# Patient Record
Sex: FEMALE | Race: WHITE | NOT HISPANIC OR LATINO | Employment: STUDENT | URBAN - METROPOLITAN AREA
[De-identification: names, ages, dates, MRNs, and addresses within clinical notes are randomized per-mention and may not be internally consistent; named-entity substitution may affect disease eponyms.]

---
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
## 2024-07-15 ENCOUNTER — OFFICE VISIT (OUTPATIENT)
Dept: PEDIATRICS | Facility: CLINIC | Age: 19
End: 2024-07-15
Payer: COMMERCIAL

## 2024-07-15 VITALS
BODY MASS INDEX: 19.88 KG/M2 | WEIGHT: 115.8 LBS | HEART RATE: 86 BPM | TEMPERATURE: 97.6 F | SYSTOLIC BLOOD PRESSURE: 100 MMHG | DIASTOLIC BLOOD PRESSURE: 66 MMHG

## 2024-07-15 DIAGNOSIS — L08.9: Primary | ICD-10-CM

## 2024-07-15 DIAGNOSIS — S90.416A: Primary | ICD-10-CM

## 2024-07-15 PROCEDURE — 3008F BODY MASS INDEX DOCD: CPT | Performed by: PEDIATRICS

## 2024-07-15 PROCEDURE — 99213 OFFICE O/P EST LOW 20 MIN: CPT | Performed by: PEDIATRICS

## 2024-07-15 RX ORDER — CEPHALEXIN 500 MG/1
500 CAPSULE ORAL 3 TIMES DAILY
Qty: 30 CAPSULE | Refills: 0 | Status: SHIPPED | OUTPATIENT
Start: 2024-07-15 | End: 2024-07-25

## 2024-07-15 NOTE — PATIENT INSTRUCTIONS
Diagnoses and all orders for this visit:  Abrasion of toe with infection, unspecified laterality, initial encounter  -     cephalexin (Keflex) 500 mg capsule; Take 1 capsule (500 mg) by mouth 3 times a day for 10 days.

## 2024-07-15 NOTE — PROGRESS NOTES
Subjective   Serenity JODI bonilla 18 y.o.femalewho presents forNail Problem (18 yr old w/ mom - bilateral great toenails splitting - has had some drainage (blood/pus discharge); had reconstructive surgery about 3 yrs and problems has becoming more and more frequent over the last year.) and Head Injury (Had a piece of her dogs kennel scratch the top of her head while cleaning it yesterday )  HPI    Has had intermittent trouble with her nails draining- sometimes pink and orange and sometimes with blood. Wears tennis shoes with orthotics- has to wear those.     Also, hit head with kennel.       Objective   /66 (BP Location: Right arm, BP Cuff Size: Adult)   Pulse 86   Temp 36.4 °C (97.6 °F) (Oral)   Wt 52.5 kg (115 lb 12.8 oz)   BMI 19.88 kg/m²       Physical Exam      Wnl except mildly infected toes      No visits with results within 10 Day(s) from this visit.   Latest known visit with results is:   Legacy Encounter on 05/06/2021   Component Date Value Ref Range Status    CRP 05/06/2021 <0.10  mg/dL Final    Vitamin D, 25-Hydroxy 05/06/2021 20 (A)  ng/mL Final    Glucose 05/06/2021 87  74 - 99 mg/dL Final    Sodium 05/06/2021 139  136 - 145 mmol/L Final    Potassium 05/06/2021 4.4  3.5 - 5.3 mmol/L Final    Chloride 05/06/2021 103  98 - 107 mmol/L Final    Bicarbonate 05/06/2021 25  18 - 27 mmol/L Final    Anion Gap 05/06/2021 15  10 - 30 mmol/L Final    Urea Nitrogen 05/06/2021 19  6 - 23 mg/dL Final    Creatinine 05/06/2021 0.63  0.50 - 0.90 mg/dL Final    Calcium 05/06/2021 10.0  8.5 - 10.7 mg/dL Final    Albumin 05/06/2021 5.0  3.4 - 5.0 g/dL Final    Alkaline Phosphatase 05/06/2021 71  45 - 108 U/L Final    Total Protein 05/06/2021 7.8 (H)  6.2 - 7.7 g/dL Final    AST 05/06/2021 18  9 - 24 U/L Final    Total Bilirubin 05/06/2021 0.4  0.0 - 0.9 mg/dL Final    ALT (SGPT) 05/06/2021 11  3 - 28 U/L Final    Sedimentation Rate 05/06/2021 18 (H)  0 - 13 mm/h Final    WBC 05/06/2021 7.5  4.5 - 13.5 x10E9/L  Final    nRBC 05/06/2021 0.0  0.0 - 0.0 /100 WBC Final    RBC 05/06/2021 4.63  4.10 - 5.20 x10E12/L Final    Hemoglobin 05/06/2021 14.3  12.0 - 16.0 g/dL Final    Hematocrit 05/06/2021 44.7  36.0 - 46.0 % Final    MCV 05/06/2021 97  78 - 102 fL Final    MCHC 05/06/2021 32.0  31.0 - 37.0 g/dL Final    Platelets 05/06/2021 316  150 - 400 x10E9/L Final    RDW 05/06/2021 12.3  11.5 - 14.5 % Final    Neutrophils % 05/06/2021 62.4  33.0 - 69.0 % Final    Immature Granulocytes %, Automated 05/06/2021 0.1  0.0 - 1.0 % Final    Lymphocytes % 05/06/2021 27.8  28.0 - 48.0 % Final    Monocytes % 05/06/2021 8.7  3.0 - 9.0 % Final    Eosinophils % 05/06/2021 0.7  0.0 - 5.0 % Final    Basophils % 05/06/2021 0.3  0.0 - 1.0 % Final    Neutrophils Absolute 05/06/2021 4.68  1.20 - 7.70 x10E9/L Final    Lymphocytes Absolute 05/06/2021 2.08  1.80 - 4.80 x10E9/L Final    Monocytes Absolute 05/06/2021 0.65  0.10 - 1.00 x10E9/L Final    Eosinophils Absolute 05/06/2021 0.05  0.00 - 0.70 x10E9/L Final    Basophils Absolute 05/06/2021 0.02  0.00 - 0.10 x10E9/L Final    Color, Urine 05/06/2021 YELLOW  STRAW,YELLOW Final    Appearance, Urine 05/06/2021 HAZY  CLEAR Final    Specific Gravity, Urine 05/06/2021 1.025  1.005 - 1.035 Final    pH, Urine 05/06/2021 6.0  5.0 - 8.0 Final    Protein, Urine 05/06/2021 NEGATIVE  NEGATIVE mg/dL Final    Glucose, Urine 05/06/2021 NEGATIVE  NEGATIVE mg/dL Final    Blood, Urine 05/06/2021 NEGATIVE  NEGATIVE Final    Ketones, Urine 05/06/2021 NEGATIVE  NEGATIVE mg/dL Final    Bilirubin, Urine 05/06/2021 NEGATIVE  NEGATIVE Final    Urobilinogen, Urine 05/06/2021 <2.0  0.0 - 1.9 mg/dL Final    Nitrite, Urine 05/06/2021 NEGATIVE  NEGATIVE Final    Leukocyte Esterase, Urine 05/06/2021 NEGATIVE  NEGATIVE Final         Assessment/Plan   Diagnoses and all orders for this visit:  Abrasion of toe with infection, unspecified laterality, initial encounter  -     cephalexin (Keflex) 500 mg capsule; Take 1 capsule (500 mg)  by mouth 3 times a day for 10 days.

## 2024-07-16 ENCOUNTER — TELEPHONE (OUTPATIENT)
Dept: PEDIATRICS | Facility: CLINIC | Age: 19
End: 2024-07-16
Payer: COMMERCIAL

## 2024-07-16 NOTE — TELEPHONE ENCOUNTER
Mom called in regards: wanted to know if there Podiatrist that you recommend? Mom is aware you are ooo until tomorrow.

## 2024-11-25 ENCOUNTER — APPOINTMENT (OUTPATIENT)
Dept: PEDIATRICS | Facility: CLINIC | Age: 19
End: 2024-11-25
Payer: COMMERCIAL

## 2024-11-25 VITALS
HEIGHT: 64 IN | SYSTOLIC BLOOD PRESSURE: 125 MMHG | BODY MASS INDEX: 19.09 KG/M2 | HEART RATE: 118 BPM | TEMPERATURE: 98.2 F | DIASTOLIC BLOOD PRESSURE: 80 MMHG | WEIGHT: 111.8 LBS

## 2024-11-25 DIAGNOSIS — F84.9 PERVASIVE DEVELOPMENTAL DISORDER (HHS-HCC): Primary | ICD-10-CM

## 2024-11-25 DIAGNOSIS — R10.31 RIGHT LOWER QUADRANT ABDOMINAL PAIN: ICD-10-CM

## 2024-11-25 PROBLEM — J30.9 ALLERGIC RHINITIS: Status: ACTIVE | Noted: 2024-11-25

## 2024-11-25 PROCEDURE — 1036F TOBACCO NON-USER: CPT | Performed by: PEDIATRICS

## 2024-11-25 PROCEDURE — 3008F BODY MASS INDEX DOCD: CPT | Performed by: PEDIATRICS

## 2024-11-25 PROCEDURE — 99213 OFFICE O/P EST LOW 20 MIN: CPT | Performed by: PEDIATRICS

## 2024-11-25 RX ORDER — POLYETHYLENE GLYCOL 3350 17 G/17G
17 POWDER, FOR SOLUTION ORAL DAILY
Qty: 527 G | Refills: 2 | Status: SHIPPED | OUTPATIENT
Start: 2024-11-25 | End: 2025-02-26

## 2024-11-25 NOTE — PROGRESS NOTES
"Subjective   Serenity F Grace bonilla 19 y.o.femalewho presents forFollow-up (Pt with mom for follow up on and off right side pain) and Abdominal Pain  HPI    Having right sided belly pain since her period- some days are worse than others.  Worse at night.   Appendix? Something else?  Up at times with it, RLQ and sometimes along underwear line.   Ibuprofen helped.   No fevers.  Has been for 1-2 weeks.   Was constipated a few days ago.       Objective   /80   Pulse (!) 118   Temp 36.8 °C (98.2 °F)   Ht 1.632 m (5' 4.25\")   Wt 50.7 kg (111 lb 12.8 oz) Comment: 111.8 lbs  BMI 19.04 kg/m²       Physical Exam    General: Well-developed, well-nourished, alert and oriented, no acute distress  Eyes: Normal sclera, PERRLA, EOMI  ENT: Moist mucous membranes,  normal throat, no nasal discharge. TMs are normal.  Cardiac:  Normal S1/S2, no murmurs, regular rhythm. Capillary refill less than 2 seconds  Pulmonary: Clear to auscultation bilaterally, no work of breathing.  GI: Mildly tender abdomen without localization and without rebound or guarding.  Skin: No rashes  Neuro: Symmetric face, no ataxia, grossly normal strength.  Lymph: No lymphadenopathy          No visits with results within 10 Day(s) from this visit.   Latest known visit with results is:   Legacy Encounter on 05/06/2021   Component Date Value Ref Range Status    CRP 05/06/2021 <0.10  mg/dL Final    Vitamin D, 25-Hydroxy 05/06/2021 20 (A)  ng/mL Final    Glucose 05/06/2021 87  74 - 99 mg/dL Final    Sodium 05/06/2021 139  136 - 145 mmol/L Final    Potassium 05/06/2021 4.4  3.5 - 5.3 mmol/L Final    Chloride 05/06/2021 103  98 - 107 mmol/L Final    Bicarbonate 05/06/2021 25  18 - 27 mmol/L Final    Anion Gap 05/06/2021 15  10 - 30 mmol/L Final    Urea Nitrogen 05/06/2021 19  6 - 23 mg/dL Final    Creatinine 05/06/2021 0.63  0.50 - 0.90 mg/dL Final    Calcium 05/06/2021 10.0  8.5 - 10.7 mg/dL Final    Albumin 05/06/2021 5.0  3.4 - 5.0 g/dL Final    Alkaline " Phosphatase 05/06/2021 71  45 - 108 U/L Final    Total Protein 05/06/2021 7.8 (H)  6.2 - 7.7 g/dL Final    AST 05/06/2021 18  9 - 24 U/L Final    Total Bilirubin 05/06/2021 0.4  0.0 - 0.9 mg/dL Final    ALT (SGPT) 05/06/2021 11  3 - 28 U/L Final    Sedimentation Rate 05/06/2021 18 (H)  0 - 13 mm/h Final    WBC 05/06/2021 7.5  4.5 - 13.5 x10E9/L Final    nRBC 05/06/2021 0.0  0.0 - 0.0 /100 WBC Final    RBC 05/06/2021 4.63  4.10 - 5.20 x10E12/L Final    Hemoglobin 05/06/2021 14.3  12.0 - 16.0 g/dL Final    Hematocrit 05/06/2021 44.7  36.0 - 46.0 % Final    MCV 05/06/2021 97  78 - 102 fL Final    MCHC 05/06/2021 32.0  31.0 - 37.0 g/dL Final    Platelets 05/06/2021 316  150 - 400 x10E9/L Final    RDW 05/06/2021 12.3  11.5 - 14.5 % Final    Neutrophils % 05/06/2021 62.4  33.0 - 69.0 % Final    Immature Granulocytes %, Automated 05/06/2021 0.1  0.0 - 1.0 % Final    Lymphocytes % 05/06/2021 27.8  28.0 - 48.0 % Final    Monocytes % 05/06/2021 8.7  3.0 - 9.0 % Final    Eosinophils % 05/06/2021 0.7  0.0 - 5.0 % Final    Basophils % 05/06/2021 0.3  0.0 - 1.0 % Final    Neutrophils Absolute 05/06/2021 4.68  1.20 - 7.70 x10E9/L Final    Lymphocytes Absolute 05/06/2021 2.08  1.80 - 4.80 x10E9/L Final    Monocytes Absolute 05/06/2021 0.65  0.10 - 1.00 x10E9/L Final    Eosinophils Absolute 05/06/2021 0.05  0.00 - 0.70 x10E9/L Final    Basophils Absolute 05/06/2021 0.02  0.00 - 0.10 x10E9/L Final    Color, Urine 05/06/2021 YELLOW  STRAW,YELLOW Final    Appearance, Urine 05/06/2021 HAZY  CLEAR Final    Specific Gravity, Urine 05/06/2021 1.025  1.005 - 1.035 Final    pH, Urine 05/06/2021 6.0  5.0 - 8.0 Final    Protein, Urine 05/06/2021 NEGATIVE  NEGATIVE mg/dL Final    Glucose, Urine 05/06/2021 NEGATIVE  NEGATIVE mg/dL Final    Blood, Urine 05/06/2021 NEGATIVE  NEGATIVE Final    Ketones, Urine 05/06/2021 NEGATIVE  NEGATIVE mg/dL Final    Bilirubin, Urine 05/06/2021 NEGATIVE  NEGATIVE Final    Urobilinogen, Urine 05/06/2021 <2.0  0.0  - 1.9 mg/dL Final    Nitrite, Urine 05/06/2021 NEGATIVE  NEGATIVE Final    Leukocyte Esterase, Urine 05/06/2021 NEGATIVE  NEGATIVE Final         Assessment/Plan     Abdominal pain  Trial of miralax- call if no better

## 2024-12-11 ENCOUNTER — APPOINTMENT (OUTPATIENT)
Dept: PEDIATRICS | Facility: CLINIC | Age: 19
End: 2024-12-11
Payer: COMMERCIAL

## 2024-12-11 VITALS
WEIGHT: 115.2 LBS | HEIGHT: 64 IN | BODY MASS INDEX: 19.67 KG/M2 | SYSTOLIC BLOOD PRESSURE: 101 MMHG | HEART RATE: 94 BPM | DIASTOLIC BLOOD PRESSURE: 67 MMHG

## 2024-12-11 DIAGNOSIS — Z00.00 WELLNESS EXAMINATION: Primary | ICD-10-CM

## 2024-12-11 DIAGNOSIS — R10.31 RIGHT LOWER QUADRANT ABDOMINAL PAIN: ICD-10-CM

## 2024-12-11 PROCEDURE — 3008F BODY MASS INDEX DOCD: CPT | Performed by: PEDIATRICS

## 2024-12-11 PROCEDURE — 1036F TOBACCO NON-USER: CPT | Performed by: PEDIATRICS

## 2024-12-11 PROCEDURE — 99395 PREV VISIT EST AGE 18-39: CPT | Performed by: PEDIATRICS

## 2024-12-11 NOTE — PATIENT INSTRUCTIONS
Diagnoses and all orders for this visit:  Wellness examination  Right lower quadrant abdominal pain  -     XR abdomen 1 view; Future       Assessment/Plan   Well adult.  1. Anticipatory guidance discussed.  2.  Weight management:  The patient was counseled regarding physical activity.  3. Development: appropriate for age   4. No orders of the defined types were placed in this encounter.      5. Follow-up visit in 1 year for next well  visit, or sooner as needed.    Check x-ray- if no better, consider ultrasound of ovaries or a CT of the abdominal cavity

## 2024-12-11 NOTE — PROGRESS NOTES
Subjective   History was provided by self  20 yo who is here for this well  visit.       Immunization History   Administered Date(s) Administered    DTaP HepB IPV combined vaccine, pedatric (PEDIARIX) 05/27/2005, 08/25/2005    DTaP vaccine, pediatric  (INFANRIX) 03/20/2005, 11/11/2005    Hepatitis A vaccine, pediatric/adolescent (HAVRIX, VAQTA) 08/14/2020, 08/16/2021    Hepatitis B vaccine, 19 yrs and under (RECOMBIVAX, ENGERIX) 2004, 05/27/2005, 08/25/2005    HiB, unspecified 08/25/2005, 11/11/2005    Hib (HbOC) 05/27/2005    MMR and varicella combined vaccine, subcutaneous (PROQUAD) 03/20/2015    MMR vaccine, subcutaneous (MMR II) 03/31/2006    Meningococcal ACWY vaccine (MENVEO) 08/14/2020    Meningococcal ACWY-D (Menactra) 4-valent conjugate vaccine 03/21/2016    Polio, Unspecified 11/11/2005    Tdap vaccine, age 7 year and older (BOOSTRIX, ADACEL) 03/20/2015    Varicella vaccine, subcutaneous (VARIVAX) 03/31/2006              History of previous adverse reactions to immunizations? no  The following portions of the patient's history were reviewed by a provider in this encounter and updated as appropriate:  Tobacco  Allergies  Meds  Problems  Med Hx  Surg Hx  Fam Hx     Concerns: 1) stabbing pain in lower abdomen, left and right- poops daily, fairly normal, rarely hurts to go. No help with miralax, did chocolate lax. Terminating medicaid. Periods are normal- not brutal.   2) anxious over the last few months- panicky at night- not sure why. Stress?   Well Assessment:  20 yo lives with family   Nutrition- did lost appetite= getting it back  Types of intake include vegetables, fruits, meats, cow's milk and cereals.   Dental  The patient has a dental home. The patient brushes teeth regularly. The patient flosses regularly. Last dental exam was less than 6 months ago.   Elimination  Elimination problems do not include constipation, diarrhea or urinary symptoms. There is no bed wetting.  "  Behavioral  Behavioral issues do not include misbehaving with siblings.   Sleep  Average sleep duration is 7 hours. The patient does not snore. There are no sleep problems.   Safety  There is no smoking in the home. Home has working smoke alarms? yes. Home has working carbon monoxide alarms? yes.   School  Works [employer] /  Screening  There are no risk factors at school. There are no risk factors related to alcohol. There are no risk factors related to drugs. There are no risk factors related to personal safety. There are no risk factors related to tobacco.   Social  Sibling interactions are good.   PERIODS: as above    Fun: music, piano, movies, sketch, walk outside               Objective   /67   Pulse 94   Ht 1.626 m (5' 4\")   Wt 52.3 kg (115 lb 3.2 oz)   BMI 19.77 kg/m²   Growth parameters are noted and are appropriate for age.   Physical Exam  Constitutional:       Appearance: Normal appearance. He is normal weight.   HENT:      Head: Normocephalic and atraumatic.      Right Ear: Tympanic membrane normal.      Left Ear: Tympanic membrane normal.      Nose: Nose normal.      Mouth/Throat:      Mouth: Mucous membranes are moist.   Eyes:      Extraocular Movements: Extraocular movements intact.      Conjunctiva/sclera: Conjunctivae normal.      Pupils: Pupils are equal, round, and reactive to light.   Cardiovascular:      Rate and Rhythm: Normal rate and regular rhythm.      Heart sounds: Normal heart sounds.   Pulmonary:      Breath sounds: Normal breath sounds.   Abdominal:      General: Abdomen is flat. Bowel sounds are normal.      Palpations: Abdomen is soft.   Genitourinary:       NORMAL FEMALE GENITALIA       Musculoskeletal:         General: Normal range of motion.      Cervical back: Normal range of motion and neck supple.   Skin:     General: Skin is warm and dry.   Neurological:      General: No focal deficit present.      Mental Status: She is alert " and oriented to person, place, and time. Mental status is at baseline.              Diagnoses and all orders for this visit:  Wellness examination  Right lower quadrant abdominal pain  -     XR abdomen 1 view; Future       Assessment/Plan   Well adult.  1. Anticipatory guidance discussed.  2.  Weight management:  The patient was counseled regarding physical activity.  3. Development: appropriate for age   4. No orders of the defined types were placed in this encounter.      5. Follow-up visit in 1 year for next well  visit, or sooner as needed.    Check x-ray- if no better, consider ultrasound of ovaries or a CT of the abdominal cavity

## 2024-12-14 ENCOUNTER — TELEPHONE (OUTPATIENT)
Dept: PEDIATRICS | Facility: CLINIC | Age: 19
End: 2024-12-14
Payer: COMMERCIAL

## 2024-12-16 ENCOUNTER — TELEPHONE (OUTPATIENT)
Dept: PEDIATRICS | Facility: CLINIC | Age: 19
End: 2024-12-16
Payer: COMMERCIAL

## 2024-12-16 DIAGNOSIS — R10.9 ABDOMINAL PAIN, UNSPECIFIED ABDOMINAL LOCATION: Primary | ICD-10-CM

## 2024-12-16 NOTE — TELEPHONE ENCOUNTER
I am happy to do some baseline labs but I more discussed an ultrasound of her ovaries if no better. Waiting on the x-ray results.

## 2024-12-16 NOTE — TELEPHONE ENCOUNTER
Mom called asking if you could put in the blood work that was discussed at [facility]'s well visit. Mom was also asking if you got back the X-rays that were done.

## 2024-12-16 NOTE — TELEPHONE ENCOUNTER
Mom wants labs ordered and if needed pending the x-ray, the ultrasound order after those results.

## 2024-12-17 LAB
NON-UH HIE A/G RATIO: 1.3
NON-UH HIE ALB: 4.3 G/DL (ref 3.4–5)
NON-UH HIE ALK PHOS: 62 UNIT/L (ref 45–117)
NON-UH HIE BASO COUNT: 0.05 X1000 (ref 0–0.2)
NON-UH HIE BASOS %: 0.8 %
NON-UH HIE BILIRUBIN, TOTAL: 0.4 MG/DL (ref 0.3–1.2)
NON-UH HIE BUN/CREAT RATIO: 14.3
NON-UH HIE BUN: 10 MG/DL (ref 9–23)
NON-UH HIE C-REACTIVE PROTEIN, QUANTITATIVE: <0.5 MG/DL (ref 0–0.5)
NON-UH HIE CALCIUM: 10.1 MG/DL (ref 8.7–10.4)
NON-UH HIE CALCULATED OSMOLALITY: 281 MOSM/KG (ref 275–295)
NON-UH HIE CHLORIDE: 108 MMOL/L (ref 98–107)
NON-UH HIE CO2, VENOUS: 27 MMOL/L (ref 20–31)
NON-UH HIE CREATININE: 0.7 MG/DL (ref 0.5–0.8)
NON-UH HIE DIFF?: NO
NON-UH HIE EOS COUNT: 0.05 X1000 (ref 0–0.5)
NON-UH HIE EOSIN %: 0.9 %
NON-UH HIE FREE T4: 1.17 NG/DL (ref 0.89–1.76)
NON-UH HIE GFR AA: >60
NON-UH HIE GLOBULIN: 3.3 G/DL
NON-UH HIE GLOMERULAR FILTRATION RATE: >60 ML/MIN/1.73M?
NON-UH HIE GLUCOSE: 72 MG/DL (ref 74–106)
NON-UH HIE GOT: 23 UNIT/L (ref 15–37)
NON-UH HIE GPT: 19 UNIT/L (ref 10–49)
NON-UH HIE HCT: 39.9 % (ref 36–46)
NON-UH HIE HGB: 13.1 G/DL (ref 12–16)
NON-UH HIE INSTR WBC: 5.7
NON-UH HIE K: 4.7 MMOL/L (ref 3.5–5.1)
NON-UH HIE LYMPH %: 24 %
NON-UH HIE LYMPH COUNT: 1.38 X1000 (ref 1.2–4.8)
NON-UH HIE MCH: 31.1 PG (ref 27–34)
NON-UH HIE MCHC: 32.9 G/DL (ref 32–37)
NON-UH HIE MCV: 94.4 FL (ref 80–100)
NON-UH HIE MONO %: 13.8 %
NON-UH HIE MONO COUNT: 0.79 X1000 (ref 0.1–1)
NON-UH HIE MPV: 8.8 FL (ref 7.4–10.4)
NON-UH HIE NA: 142 MMOL/L (ref 135–145)
NON-UH HIE NEUTROPHIL %: 60.4 %
NON-UH HIE NEUTROPHIL COUNT (ANC): 3.47 X1000 (ref 1.4–8.8)
NON-UH HIE NUCLEATED RBC: 0 /100WBC
NON-UH HIE PLATELET: 330 X10 (ref 150–450)
NON-UH HIE RBC: 4.23 X10 (ref 4.2–5.4)
NON-UH HIE RDW: 12.9 % (ref 11.5–14.5)
NON-UH HIE TOTAL PROTEIN: 7.6 G/DL (ref 5.7–8.2)
NON-UH HIE TSH: 1.22 UIU/ML (ref 0.55–4.78)
NON-UH HIE WBC: 5.7 X10 (ref 4.5–11)

## 2024-12-20 LAB — NON-UH HIE TISSUE TRANSGLUTAMINASE AB,IGA: <1.02 (ref 0–4.99)
